# Patient Record
Sex: FEMALE | Race: WHITE | Employment: FULL TIME | URBAN - METROPOLITAN AREA
[De-identification: names, ages, dates, MRNs, and addresses within clinical notes are randomized per-mention and may not be internally consistent; named-entity substitution may affect disease eponyms.]

---

## 2022-08-25 NOTE — PROGRESS NOTES
Vision, Glaucoma, Visual Disturbances, Hearing Loss, Ringing in the Ears, Vertigo, Nose Bleed, Bleeding Gums, Hoarseness and Sore Throat. Neck: Not Present- Neck Pain and Neck Swelling. Respiratory: Not Present- Cough, Difficulty Breathing and Difficulty Breathing on Exertion. Breast: Not Present- Breast Mass, Breast Pain, Breast Swelling, Nipple Discharge, Nipple Pain, Recent Breast Size Changes and Skin Changes. Cardiovascular: Not Present- Abnormal Blood Pressure, Chest Pain, Edema, Fainting / Blacking Out, Palpitations, Shortness of Breath and Swelling of Extremities. Gastrointestinal: Not Present- Abdominal Pain, Abdominal Swelling, Bloating, Change in Bowel Habits, Constipation, Diarrhea, Difficulty Swallowing, Gets full quickly at meals, Nausea, Rectal Bleeding and Vomiting. Female Genitourinary: Not Present- Dysmenorrhea, Dyspareunia, Decreased libido, Excessive Menstrual Bleeding, Menstrual Irregularities, Pelvic Pain, Urinary Complaints, Vaginal Discharge, Vaginal itching/burning, Vaginal odor  Musculoskeletal: Not Present- Joint Pain and Muscle Pain. Neurological: Not Present- Dizziness, Fainting, Headaches and Seizures. Psychiatric: Not Present- Anxiety, Depression, Mood changes and Panic Attacks. Endocrine: Not Present- Appetite Changes, Cold Intolerance, Excessive Thirst, Excessive Urination and Heat Intolerance. Hematology: Not Present- Abnormal Bleeding, Easy Bruising and Enlarged Lymph Nodes. PHYSICAL EXAM:     /64   Ht 5' 4\" (1.626 m)   Wt 115 lb (52.2 kg)   BMI 19.74 kg/m²     Physical Exam   General   Mental Status - Alert. General Appearance - Cooperative. Integumentary   General Characteristics: Overall examination of the patient's skin reveals - no rashes and no suspicious lesions. Head and Neck  Head - normocephalic, atraumatic with no lesions or palpable masses.    Neck Note: Normal   Thyroid   Gland Characteristics - normal size and consistency and no palpable nodules. Chest and Lung Exam   Chest and lung exam reveals - on auscultation, normal breath sounds, no adventitious sounds and normal vocal resonance. Breast   Breast - Left - Normal. Right - Normal.     Cardiovascular   Cardiovascular examination reveals - normal heart sounds, regular rate and rhythm with no murmurs. Abdomen   Inspection: - Inspection Normal.   Palpation/Percussion: Palpation and Percussion of the abdomen reveal - Non Tender, No Rebound tenderness, No Rigidity (guarding), No hepatosplenomegaly, No Palpable abdominal masses and Soft. Auscultation: Auscultation of the abdomen reveals - Bowel sounds normal.     Female Genitourinary     External Genitalia   Vulva: - Normal. Perineum - Normal. Bartholin's Gland - Bilateral - Normal. Clitoris - Normal.   Introitus: Characteristics - Normal.   Urethra: Characteristics - Normal.     Speculum & Bimanual   Vagina: Vaginal Mucosa - Normal.   Vaginal Wall: - Normal.   Vaginal Lesions - None. Cervix: Characteristics - Normal.   Uterus: Characteristics - Normal.   Adnexa: - Normal.   Bladder - Normal.     Peripheral Vascular   Normal    Neuropsychiatric   Examination of related systems reveals - The patient is well-nourished and well-groomed. Mental status exam performed with findings of - Oriented X3 with appropriate mood and affect. Musculoskeletal  Normal      General Lymphatics  Normal           Medical problems and test results were reviewed with the patient today. ASSESSMENT and PLAN    1. Encounter for well woman exam with routine gynecological exam  2. Cervical cancer screening  -     PAP LB, Reflex HPV ASCUS           No follow-ups on file.        Brandon Oneal MD  8/26/2022

## 2022-08-26 ENCOUNTER — OFFICE VISIT (OUTPATIENT)
Dept: GYNECOLOGY | Age: 52
End: 2022-08-26
Payer: COMMERCIAL

## 2022-08-26 VITALS
BODY MASS INDEX: 19.63 KG/M2 | WEIGHT: 115 LBS | HEIGHT: 64 IN | SYSTOLIC BLOOD PRESSURE: 100 MMHG | DIASTOLIC BLOOD PRESSURE: 64 MMHG

## 2022-08-26 DIAGNOSIS — Z12.4 CERVICAL CANCER SCREENING: ICD-10-CM

## 2022-08-26 DIAGNOSIS — Z01.419 ENCOUNTER FOR WELL WOMAN EXAM WITH ROUTINE GYNECOLOGICAL EXAM: Primary | ICD-10-CM

## 2022-08-26 PROCEDURE — 99396 PREV VISIT EST AGE 40-64: CPT | Performed by: OBSTETRICS & GYNECOLOGY

## 2022-09-01 LAB
CYTOLOGIST CVX/VAG CYTO: NORMAL
CYTOLOGY CVX/VAG DOC THIN PREP: NORMAL
HPV REFLEX: NORMAL
Lab: NORMAL
PATH REPORT.FINAL DX SPEC: NORMAL
STAT OF ADQ CVX/VAG CYTO-IMP: NORMAL

## 2022-09-13 ENCOUNTER — HOSPITAL ENCOUNTER (OUTPATIENT)
Dept: PHYSICAL THERAPY | Age: 52
Setting detail: RECURRING SERIES
Discharge: HOME | End: 2022-09-16
Payer: COMMERCIAL

## 2022-09-13 PROCEDURE — 97110 THERAPEUTIC EXERCISES: CPT

## 2022-09-13 PROCEDURE — 97161 PT EVAL LOW COMPLEX 20 MIN: CPT

## 2022-09-14 ASSESSMENT — PAIN SCALES - GENERAL: PAINLEVEL_OUTOF10: 4

## 2022-09-14 NOTE — PROGRESS NOTES
Timoteo Edmond  : 1970  Primary: Charlie Mckee Sc  Secondary:  [address] @ [address] [place] [address]  Phone: [phone]  Fax: [phone] Plan Frequency: 1x/week (unable to meet recommended frequency of 2x/week due to time constraints and financial concerns)    Plan of Care/Certification Expiration Date: 11/10/22    PT Visit Info:  No Show: 0  Canceled Appointment: 0   Visit Count:  1   OUTPATIENT PHYSICAL THERAPY:OP NOTE TYPE: Treatment Note 2022       Episode  }Appt Desk             ICD-10: Treatment Diagnosis: Low back pain (M54.5)  Pain in left hip (M25.552)    Medical/Referring Diagnosis:  back pain  hip pain  Referring Physician:  Robb Aguilar MD MD Orders:  PT Eval and Treat   Date of Onset:  Onset Date: 22   Allergies:   Amoxicillin, Cephalexin, and Fluticasone-salmeterol  Restrictions/Precautions:  Restrictions/Precautions: None  No data recorded   Interventions Planned (Treatment may consist of any combination of the following):    Current Treatment Recommendations: Strengthening; Endurance training; Neuromuscular re-education; Manual Therapy - Soft Tissue Mobilization; Manual Therapy - Joint Manipulation; Pain management; Home exercise program; Modalities; Integrated dry needling; Therapeutic activities     Subjective Comments: See Initial Evaluation dated 22 for details     Initial:}    10Post Session:       4/10  Medications Last Reviewed:  2022  Updated Objective Findings:  See Initial Evaluation dated 22 for details  Treatment     THERAPEUTIC EXERCISE: (20 minutes):    Exercises per grid below to improve mobility, strength, balance, and coordination. Progressed resistance and repetitions as indicated.      Date:  22 Date:   Date:     Activity/Exercise Parameters Parameters Parameters   education Diagnosis, prognosis, POC, HEP, anatomy/physiology of condition       clam 3x10, blue band     Fire hydrant 3x10/side     Standing hip abd 2x10, blue band                           THERAPEUTIC ACTIVITY: ( 0 minutes): Therapeutic activities per grid below to improve mobility, strength, coordination, and dynamic movement  to improve functional lifting, carrying, reaching, catching, and overhead activities. Date:   Date:   Date:     Activity/Exercise Parameters Parameters Parameters                                                 MANUAL THERAPY: (0 minutes):   Joint mobilization, Soft tissue mobilization, and Manipulation was utilized and necessary because of the patient's restricted joint motion, painful spasm, loss of articular motion, and restricted motion of soft tissue. Date  9/13/2022      Technique Used Grade Level # Time(s) Effect while being performed                                                                                         HEP Log Date    see 9/13/22    2.     3.    4.     5.        POC    Recertification Expiration Date      Plan of Care/Certification Expiration Date: 11/10/22   Visit Count  1    Number of Allowed Visits              Treatment/Session Summary:    Treatment Assessment: See Initial Evaluation dated 9/13/22 for details     Communication/Consultation:  faxed initial evaluation to referring provider  Equipment provided today:  HEP handout, blue band  Recommendations/Intent for next treatment session: Next visit will focus on glute strengthening to tolerance.     Total Treatment Billable Duration:  20 minutes  Time In: 1879  Time Out: [address], PT       Charge Capture  }Post Session Pain  PT Visit [facility] Portal  MD Guidelines  Scanned Media  Benefits  MyChart    Future Appointments   Date Time Provider Connor Rodriguez   9/19/2022  1:45 PM Latasha Davis PT [facility] AND [facility]   9/28/2022  4:45 PM Latasha Davis PT [facility]

## 2022-09-14 NOTE — THERAPY EVALUATION
Marie   : 1970  Primary: Diana Mendoza Sc  Secondary:  [address] [address] [address] [address]  Phone: [phone]  Fax: [phone] Plan Frequency: 1x/week (unable to meet recommended frequency of 2x/week due to time constraints and financial concerns)    Plan of Care/Certification Expiration Date: 11/10/22    PT Visit Info:    No Show: 0  Canceled Appointment: 0    Visit Count:  1    OUTPATIENT PHYSICAL THERAPY:OP NOTE TYPE: Initial Assessment 2022               Episode  Appt Desk         ICD-10: Treatment Diagnosis: Low back pain (M54.5)  Pain in left hip (M25.552)  Medical/Referring Diagnosis:  back pain  hip pain  Referring Physician:  Ebony Melendez MD MD Orders:  PT Eval and Treat   Return MD Appt:  TBD  Date of Onset:  Onset Date: 22   Allergies:  Amoxicillin, Cephalexin, and Fluticasone-salmeterol  Restrictions/Precautions:    Restrictions/Precautions: None  No data recorded   Medications Last Reviewed:  2022     SUBJECTIVE   History of Injury/Illness (Reason for Referral):  Pt reports L hip pain and \"tailbone\" pain starting after a slip down 5 steps in 2022. She states she didn't have any pain the rest of that day but she did when she woke up the next morning. She states she didn't go to the doctor immediately because she figured nothing was broken. She eventually did have x-rays done that revealed no fractures. She's been going to her chiropractor and they have been working on her hip flexors, which seemed to help some. Sometimes, the pain will travel from her lateral hip to her medial knee. She notes the most trouble with the first several steps after sitting for a while, prolonged walking (as in at work), and sleeping due to pain. She states that she used a massager on her lateral hip and it seemed to make it feel a little better.  She regularly does bodyweight exercises at home for general fitness. Patient Stated Goal(s):  pain relief  Initial:     4/10 Post Session:     4/10  Past Medical History/Comorbidities:   Ms. Franck Meyers  has a past medical history of Hx of varicose veins. Ms. Franck Meyers  has a past surgical history that includes gyn. 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  No [address] recorded   Learning:   Does the patient/guardian have any barriers to learning?: No barriers  Will there be a co-learner?: No  What is the preferred language of the patient/guardian?: English  Is an  required?: No  How does the patient/guardian prefer to learn new concepts?: Listening; Demonstration; Pictures/Videos   Fall Risk Scale: Total Score: 0  Hernandez Fall Risk: Low (0-24)   Other Clinical Tests:  X-rays: negative for fracture per pt      OBJECTIVE     Observation/Orthostatic Postural Assessment:  [] This section not tested    General observations  Description: no gait abnormalities noted       Range of Motion   [] This section not tested    AROM     Hip         Lumbar    Flexion: 95 deg  Extension: 30 seg  Sidebending: fingers to knee bilaterally.  \"Pull\" on L when going to R  Rotation: shoulder to midline bilaterally    All lumbar ROM pain-free     Ankle           PROM    Hip   R Hip Flexion 0-125: ~130  R SLR: ~95  R Hip Internal Rotation 0-45: ~40  R Hip External Rotation 0-45: ~75  L Hip Flexion 0-125: ~130  L SLR: ~95  L Hip Internal Rotation 0-45: ~40  L Hip External Rotation 0-45: ~65 with familiar pain to lateral L hip     Knee         Ankle            Strength  [] This section not tested      Right Left   Hip     Flexion 4/5 4/5   Extension       Abduction 4/5 4-/5 (with familiar pain)   Adduction       Knee     Flexion 4+/5 4+/5   Extension 5/5 5/5   Ankle     Dorsiflexion 4/5 4/5   Plantarflexion             Special Tests  [] This section not tested    Test/Result    Jatinder Bustillo: R negative (20 deg), L positive for lateral hip pain (35 deg)       Joint Mobility Assessment/Palpation   [] This section not tested    Joint         Palpation    L hip: TTP with familiar pain reproduction over glute med       Muscle Length/Flexibility  [] This section not tested    Test/Result   Right Hamstrings: Elongated (~95 deg SLR)  Left Hamstrings: Elongated (~95 deg SLR)         Functional Mobility  [x] This section not tested    Test Result   Timed up and go     30 second sit to stand     6 minute walk test     Single Leg Balance         Test Comments   Sit to stand    Squat    Stair Negotiation        ASSESSMENT   Initial Assessment:  Marcello Albrecht presents to physical therapy with decreased strength, ROM, joint mobility, functional mobility. These S/S are consistent with referring diagnosis. Patient will benefit from skilled physical therapy for manual therapeutic techniques (as appropriate), therapeutic exercises and activities, neuromuscular re-education, and comprehensive home exercises program to address current impairments and functional limitations. Marcello Albrecht will benefit from skilled PT (medically necessary) in order to address above deficits affecting participation in basic ADLs and overall functional tolerance. Problem List: (Impacting functional limitations): Body Structures, Functions, Activity Limitations Requiring Skilled Therapeutic Intervention: Decreased functional mobility ; Decreased ROM; Decreased tolerance to work activity; Decreased strength;  Increased pain     Therapy Prognosis:   Therapy Prognosis: Good     Assessment Complexity:   Low Complexity  PLAN   Effective Dates: 9/14/2022   TO Plan of Care/Certification Expiration Date: 11/10/22 Frequency/Duration: Plan Frequency: 1x/week (unable to meet recommended frequency of 2x/week due to time constraints and financial concerns)     Interventions Planned (Treatment may consist of any combination of the following):    Current Treatment Recommendations: Strengthening; Endurance training; Neuromuscular re-education; Manual Therapy - Soft Tissue Mobilization; Manual Therapy - Joint Manipulation; Pain management; Home exercise program; Modalities; Integrated dry needling; Therapeutic activities     Goals: (Goals have been discussed and agreed upon with patient.)  Short-Term Functional Goals: Time Frame: 4 weeks  Pt will improve L Jaswant within 5 deg of R to demonstrate improved ROM. Pt will improve L hip ABD strength to at least 4/5 to demonstrate improved strength. Discharge Goals: Time Frame: 8 weeks  Pt will improve CHINO to <=5/50 to demonstrate improved functional abilities. Pt will lift at least 44 lbs from floor to waist to demonstrate improved functional abilities. Pt will report being able to complete full shift at work with no more than 2/10 pain to demonstrate improved tolerance to work activities. Outcome Measure: Tool Used: Lower Extremity Functional Scale (LEFS)  Score:  Initial: 71/80 Most Recent: X/80 (Date: -- )   Interpretation of Score: 20 questions each scored on a 5 point scale with 0 representing \"extreme difficulty or unable to perform\" and 4 representing \"no difficulty\". The lower the score, the greater the functional disability. 80/80 represents no disability. Minimal detectable change is 9 points. Tool Used: Modified Oswestry Low Back Pain Questionnaire  Score:  Initial: 14/50  Most Recent: X/50 (Date: -- )   Interpretation of Score: Each section is scored on a 0-5 scale, 5 representing the greatest disability. The scores of each section are added together for a total score of 50.       Medical Necessity:   Skilled intervention continues to be required due to current impairments. Reason For Services/Other Comments:  Patient continues to require skilled intervention due to patient continues to present with impairments assessed at initial evaluation and requiring skilled physical therapy to meet goals for PT. Total Duration:  Time In: 1642  Time Out: 8200    Regarding Dennys Figueroa's therapy, I certify that the treatment plan above will be carried out by a therapist or under their direction.   Thank you for this referral,  Carson Calzada, PT     Referring Physician Signature: Timoteo Oliveira MD _______________________________ Date _____________        Post Session Pain  Charge Capture  PT Visit Info MD Guidelines  [place]

## 2022-09-19 ENCOUNTER — HOSPITAL ENCOUNTER (OUTPATIENT)
Dept: PHYSICAL THERAPY | Age: 52
Setting detail: RECURRING SERIES
Discharge: HOME | End: 2022-09-22
Payer: COMMERCIAL

## 2022-09-19 PROCEDURE — 97110 THERAPEUTIC EXERCISES: CPT

## 2022-09-19 NOTE — PROGRESS NOTES
Osman Claudio  : 1970  Primary: Leti [place] Sc  Secondary:  Mirela Hutchinson @ [address] [facility] [address]  Phone: [phone]  Fax: [phone] Plan Frequency: 1x/week (unable to meet recommended frequency of 2x/week due to time constraints and financial concerns)    Plan of Care/Certification Expiration Date: 11/10/22      PT Visit Info:  No Show: 0  Canceled Appointment: 0     Visit Count:  2   OUTPATIENT PHYSICAL THERAPY:OP NOTE TYPE: Treatment Note 2022       Episode  }Appt Desk             ICD-10: Treatment Diagnosis: Low back pain (M54.5)  Pain in left hip (M25.552)    Medical/Referring Diagnosis:  back pain  hip pain  Referring Physician:  Rafael Lo MD MD Orders:  PT Eval and Treat   Date of Onset:  Onset Date: 22     Allergies:   Amoxicillin, Cephalexin, and Fluticasone-salmeterol  Restrictions/Precautions:  Restrictions/Precautions: None  No data recorded   Interventions Planned (Treatment may consist of any combination of the following):    Current Treatment Recommendations: Strengthening; Endurance training; Neuromuscular re-education; Manual Therapy - Soft Tissue Mobilization; Manual Therapy - Joint Manipulation; Pain management; Home exercise program; Modalities; Integrated dry needling; Therapeutic activities     Subjective Comments: Feels like her hip is getting \"more limber. \" She's been working on the sidelying hip ABD and feels that it's been helpful. Initial:}     /10Post Session:        /10  Medications Last Reviewed:  2022  Updated Objective Findings:  See Initial Evaluation dated 22 for details  Treatment     THERAPEUTIC EXERCISE: (40 minutes):    Exercises per grid below to improve mobility, strength, balance, and coordination. Progressed resistance and repetitions as indicated.      Date:  22 Date:  22 Date:     Activity/Exercise Parameters Parameters Parameters   education Diagnosis, prognosis, POC, HEP, anatomy/physiology of condition       clam 3x10, blue band     Fire hydrant 3x10/side     Standing hip abd 2x10, blue band     Treadmill walking  8 min, 3.1 mph    Sidesteps and monster walks  2x40 feet, old purple band    Hip hikes  3x10    Cross over step ups  3x10    Lateral step downs  3x10, 6 inch    STS  [id number] lbs, 12 inch, red band    Deadlift  [id number] lbs    Suitcase carries  4 laps, 20 lbs              THERAPEUTIC ACTIVITY: ( 0 minutes): Therapeutic activities per grid below to improve mobility, strength, coordination, and dynamic movement  to improve functional lifting, carrying, reaching, catching, and overhead activities. Date:   Date:   Date:     Activity/Exercise Parameters Parameters Parameters                                                 MANUAL THERAPY: (0 minutes):   Joint mobilization, Soft tissue mobilization, and Manipulation was utilized and necessary because of the patient's restricted joint motion, painful spasm, loss of articular motion, and restricted motion of soft tissue. Date  9/19/2022      Technique Used Grade Level # Time(s) Effect while being performed                                                                                         HEP Log Date    see 9/13/22    2.     3.    4.     5.        POC    Recertification Expiration Date      Plan of Care/Certification Expiration Date: 11/10/22     Visit Count  2    Number of Allowed Visits              Treatment/Session Summary:    Treatment Assessment: Increased volume of hip endurance exercises, focusing on CKC. Pt reported some fatigue by the end of the session but reported no increase in pain. Pt would continue to benefit from progression as tolerated to reduce lateral hip pain with prolonged walking.       Communication/Consultation:  faxed initial evaluation to referring provider  Equipment provided today:  HEP handout, blue band  Recommendations/Intent for next treatment session: Next visit will focus on glute strengthening to tolerance.     Total Treatment Billable Duration:  40 minutes  Time In: 0742  Time Out: 79-25 Marlyn Graham, PT       Charge Capture  }Post Session Pain  PT Visit [address] Portal  MD [address]    Future Appointments   Date Time Provider Connor Rodriguez   9/28/2022  4:45 PM Kat Salgado, PT [facility]

## 2022-09-28 ENCOUNTER — HOSPITAL ENCOUNTER (OUTPATIENT)
Dept: PHYSICAL THERAPY | Age: 52
Setting detail: RECURRING SERIES
Discharge: HOME | End: 2022-10-01
Payer: COMMERCIAL

## 2022-09-28 PROCEDURE — 97110 THERAPEUTIC EXERCISES: CPT

## 2022-10-10 ENCOUNTER — HOSPITAL ENCOUNTER (OUTPATIENT)
Dept: PHYSICAL THERAPY | Age: 52
Setting detail: RECURRING SERIES
Discharge: HOME | End: 2022-10-13
Payer: COMMERCIAL

## 2022-10-10 PROCEDURE — 97110 THERAPEUTIC EXERCISES: CPT

## 2022-10-10 NOTE — THERAPY DISCHARGE
Robbie Matthews  : 1970  Primary: Dakota Jim[address]nez Sc  Secondary:  [address] @ [address] [address] [address]  Phone: [phone]  Fax: [phone] Plan Frequency: 1x/week (unable to meet recommended frequency of 2x/week due to time constraints and financial concerns)    Plan of Care/Certification Expiration Date: 11/10/22      PT Visit Info:    No Show: 0  Canceled Appointment: 0      Visit Count:  4    OUTPATIENT PHYSICAL THERAPY:OP NOTE TYPE: Discharge Summary 10/10/2022               Episode  Appt Desk         ICD-10: Treatment Diagnosis: Low back pain (M54.5)  Pain in left hip (M25.552)  Medical/Referring Diagnosis:  back pain  hip pain  Referring Physician:  Era Stone MD MD Orders:  PT Eval and Treat   Return MD Appt:  TBD  Date of Onset:  Onset Date: 22     Allergies:  Amoxicillin, Cephalexin, and Fluticasone-salmeterol  Restrictions/Precautions:    Restrictions/Precautions: None  No data recorded   Medications Last Reviewed:  10/10/2022     SUBJECTIVE   History of Injury/Illness (Reason for Referral):  Pt reports L hip pain and \"tailbone\" pain starting after a slip down 5 steps in 2022. She states she didn't have any pain the rest of that day but she did when she woke up the next morning. She states she didn't go to the doctor immediately because she figured nothing was broken. She eventually did have x-rays done that revealed no fractures. She's been going to her chiropractor and they have been working on her hip flexors, which seemed to help some. Sometimes, the pain will travel from her lateral hip to her medial knee. She notes the most trouble with the first several steps after sitting for a while, prolonged walking (as in at work), and sleeping due to pain. She states that she used a massager on her lateral hip and it seemed to make it feel a little better.  She regularly does bodyweight exercises at home for general fitness. Discharge: Feeling pretty good. Confident in doing her exercises at home. Patient Stated Goal(s):  pain relief  Initial:      /10 Post Session:      /10  Past Medical History/Comorbidities:   Ms. Fausto Gold  has a past medical history of Hx of varicose veins. Ms. Fausto Gold  has a past surgical history that includes gyn. Social History/Living Environment:   Lives With: Spouse     Prior Level of Function/Work/Activity:   Prior level of function: Independent  Current level of function: difficulty walking >2-3 hours, difficulty walking after sitting, difficulty sleeping, pain with stairs  Occupation: Full time employment  Type of Occupation: works in manufacturing  No [address] recorded   Learning:   Does the patient/guardian have any barriers to learning?: No barriers  Will there be a co-learner?: No  What is the preferred language of the patient/guardian?: English  Is an  required?: No  How does the patient/guardian prefer to learn new concepts?: Listening; Demonstration; Pictures/Videos     Fall Risk Scale: Hernandez Total Score: 0  Hernandez Fall Risk: Low (0-24)     Other Clinical Tests:  X-rays: negative for fracture per pt       ASSESSMENT   Initial Assessment:  Keya Lucio demonstrates significantly improved functional strength and self-reported decrease in pain and improvement in functional abilities. Pt demonstrates independence with HEP and is appropriate to discharge at this time. PLAN   Discharge     Goals: (Goals have been discussed and agreed upon with patient.)    Discharge Goals: Time Frame: 8 weeks  Pt will improve CHINO to <=5/50 to demonstrate improved functional abilities. (Met)  Pt will lift at least 44 lbs from floor to waist to demonstrate improved functional abilities. (Met)  Pt will report being able to complete full shift at work with no more than 2/10 pain to demonstrate improved tolerance to work activities. (Met)          Outcome Measure:    Tool Used: Lower Extremity Functional Scale (LEFS)  Score:  Initial: 71/80 Most Recent: 80/80 (Date: 10/10/22 )   Interpretation of Score: 20 questions each scored on a 5 point scale with 0 representing \"extreme difficulty or unable to perform\" and 4 representing \"no difficulty\". The lower the score, the greater the functional disability. 80/80 represents no disability. Minimal detectable change is 9 points. Tool Used: Modified Oswestry Low Back Pain Questionnaire  Score:  Initial: 14/50  Most Recent: 0/50 (Date: 10/10/22 )   Interpretation of Score: Each section is scored on a 0-5 scale, 5 representing the greatest disability. The scores of each section are added together for a total score of 50. Medical Necessity:   Skilled intervention continues to be required due to current impairments. Reason For Services/Other Comments:  Patient continues to require skilled intervention due to patient continues to present with impairments assessed at initial evaluation and requiring skilled physical therapy to meet goals for PT. Total Duration:  Time In: 1545  Time Out: 36    Regarding Kings Figueroa's therapy, I certify that the treatment plan above will be carried out by a therapist or under their direction.   Thank you for this referral,  Armaan Sam, PT     Referring Physician Signature: Sameer Robert MD _______________________________ Date _____________        Post Session Pain  Charge Capture  PT Visit Info MD Handy Collinsharashley

## 2022-10-10 NOTE — PROGRESS NOTES
Jammie Velazquez  : 1970  Primary: Emerald Burciaga Sc  Secondary:  [address] @ [address] [address] [address]  Phone: [phone]  Fax: [phone] Plan Frequency: 1x/week (unable to meet recommended frequency of 2x/week due to time constraints and financial concerns)    Plan of Care/Certification Expiration Date: 11/10/22      PT Visit Info:  No Show: 0  Canceled Appointment: 0     Visit Count:  4   OUTPATIENT PHYSICAL THERAPY:OP NOTE TYPE: Treatment Note 10/10/2022       Episode  }Appt Desk             ICD-10: Treatment Diagnosis: Low back pain (M54.5)  Pain in left hip (M25.552)    Medical/Referring Diagnosis:  back pain  hip pain  Referring Physician:  Inna Major MD MD Orders:  PT Eval and Treat   Date of Onset:  Onset Date: 22     Allergies:   Amoxicillin, Cephalexin, and Fluticasone-salmeterol  Restrictions/Precautions:  Restrictions/Precautions: None  No data recorded   Interventions Planned (Treatment may consist of any combination of the following):    Current Treatment Recommendations: Strengthening; Endurance training; Neuromuscular re-education; Manual Therapy - Soft Tissue Mobilization; Manual Therapy - Joint Manipulation; Pain management; Home exercise program; Modalities; Integrated dry needling; Therapeutic activities     Subjective Comments: See discharge summary dated 10/10/22 for details     Initial:     /10Post Session:        /10  Medications Last Reviewed:  10/10/2022  Updated Objective Findings:  See discharge summary dated 10/10/22 for details  Treatment     THERAPEUTIC EXERCISE: (45 minutes):    Exercises per grid below to improve mobility, strength, balance, and coordination. Progressed resistance and repetitions as indicated.      Date:  22 Date:  22 Date:   Date  10/10/22   Activity/Exercise Parameters Parameters Parameters    education Diagnosis, prognosis, POC, HEP, anatomy/physiology of condition Strength rep ranges, rest between sets   clam 3x10, blue band      Fire hydrant 3x10/side      Standing hip abd 2x10, blue band      Treadmill walking  8 min, 3.1 mph 8 min, 3.1 mph 8 min   Sidesteps and monster walks  2x40 feet, old purple band 2x40 feet, old purple band + 10 lbs at chest 2x40 feet, blue   Hip hikes  3x10     Cross over step ups  3x10     Lateral step downs  3x10, 6 inch     STS  1j16s14 lbs, 12 inch, red band (Back squat) 5i67u49 lbs 3 warm up sets  3x5x45, 2 reps in reserve   Deadlift  3k18y45 lbs 5u82s92 lbs    Suitcase carries  4 laps, 20 lbs     Sled push/pull   120 lbs, x4 120 lbs, x4       THERAPEUTIC ACTIVITY: ( 0 minutes): Therapeutic activities per grid below to improve mobility, strength, coordination, and dynamic movement  to improve functional lifting, carrying, reaching, catching, and overhead activities. Date:   Date:   Date:     Activity/Exercise Parameters Parameters Parameters                                                 MANUAL THERAPY: (0 minutes):   Joint mobilization, Soft tissue mobilization, and Manipulation was utilized and necessary because of the patient's restricted joint motion, painful spasm, loss of articular motion, and restricted motion of soft tissue.               Date  10/10/2022      Technique Used Grade Level # Time(s) Effect while being performed                                                                                         HEP Log Date    see 9/13/22    2.     3.    4.     5.        POC    Recertification Expiration Date      Plan of Care/Certification Expiration Date: 11/10/22     Visit Count  4    Number of Allowed Visits              Treatment/Session Summary:    Treatment Assessment: See discharge summary dated 10/10/22 for details     Communication/Consultation:  See discharge summary dated 10/10/22 for details  Equipment provided today:  none today  Recommendations/Intent for next treatment session: See discharge summary dated 10/10/22 for details    Total Treatment Billable Duration:  45 minutes  Time In: 4073  Time Out: [address], PT       Charge Capture  }Post Session Pain  PT Visit [address] Portal  MD Guidelines  Scanned Media  Benefits  MyChart    Future Appointments   Date Time Provider Connor Rodriguez   10/20/2022  5:15 PM [facility]ckenzie[facility]

## 2022-10-11 ENCOUNTER — APPOINTMENT (OUTPATIENT)
Dept: PHYSICAL THERAPY | Age: 52
End: 2022-10-11
Payer: COMMERCIAL

## 2022-10-20 ENCOUNTER — APPOINTMENT (OUTPATIENT)
Dept: PHYSICAL THERAPY | Age: 52
End: 2022-10-20
Payer: COMMERCIAL

## 2023-09-01 NOTE — PROGRESS NOTES
Kuldeep Clifton  : 1970  Primary: Kiara Coombs  Secondary:  [address] [address] [address] [address]  Phone: [phone]  Fax: [phone] Plan Frequency: 1x/week (unable to meet recommended frequency of 2x/week due to time constraints and financial concerns)    Plan of Care/Certification Expiration Date: 11/10/22      PT Visit Info:  No Show: 0  Canceled Appointment: 0     Visit Count:  3   OUTPATIENT PHYSICAL THERAPY:OP NOTE TYPE: Treatment Note 2022       Episode  }Appt Desk             ICD-10: Treatment Diagnosis: Low back pain (M54.5)  Pain in left hip (M25.552)    Medical/Referring Diagnosis:  back pain  hip pain  Referring Physician:  Alice Aranda MD MD Orders:  PT Eval and Treat   Date of Onset:  Onset Date: 22     Allergies:   Amoxicillin, Cephalexin, and Fluticasone-salmeterol  Restrictions/Precautions:  Restrictions/Precautions: None  No data recorded   Interventions Planned (Treatment may consist of any combination of the following):    Current Treatment Recommendations: Strengthening; Endurance training; Neuromuscular re-education; Manual Therapy - Soft Tissue Mobilization; Manual Therapy - Joint Manipulation; Pain management; Home exercise program; Modalities; Integrated dry needling; Therapeutic activities     Subjective Comments: Feels like she's improving. Had an instance of pain after a heavier day of work but overall she's felt pretty good. She's been doing all of the exercises that we did last session every day and has been feeling pretty good with it, though she felt tired yesterday. Initial:}     /10Post Session:        /10  Medications Last Reviewed:  2022  Updated Objective Findings:  See Initial Evaluation dated 22 for details  Treatment     THERAPEUTIC EXERCISE: (43 minutes):    Exercises per grid below to improve mobility, strength, balance, and coordination.    Progressed resistance and No protocol for requested medication     Last office visit date: 6/12/2023  Preferred pharmacy: [id number]    Order pended, routed to clinician for review.     Refill Requested:   Disp Refills Start End    methylphenidate (METADATE CD) 40 MG CR capsule 30 capsule 0 7/11/2023     Sig - Route: Take 1 capsule by mouth every morning      Recommended Follow Up: 11wks  No Show/Cancel: na  Next visit: 9/6/2023    Controlled Med - Routed to Provider due to NO PROTOCOL. Orders have been prepped according to providers note.     Ordered date: 6/12/2023  Last RX dispensed (per PDMP): 7/23/2023     repetitions as indicated. Date:  9/13/22 Date:  9/19/22 Date:     Activity/Exercise Parameters Parameters Parameters   education Diagnosis, prognosis, POC, HEP, anatomy/physiology of condition       clam 3x10, blue band     Fire hydrant 3x10/side     Standing hip abd 2x10, blue band     Treadmill walking  8 min, 3.1 mph 8 min, 3.1 mph   Sidesteps and monster walks  2x40 feet, old purple band 2x40 feet, old purple band + 10 lbs at chest   Hip hikes  3x10    Cross over step ups  3x10    Lateral step downs  3x10, 6 inch    STS  2p94l80 lbs, 12 inch, red band (Back squat) 1n64z13 lbs   Deadlift  5w90l07 lbs 3h16u77 lbs   Suitcase carries  4 laps, 20 lbs    Sled push/pull   120 lbs, x4       THERAPEUTIC ACTIVITY: ( 0 minutes): Therapeutic activities per grid below to improve mobility, strength, coordination, and dynamic movement  to improve functional lifting, carrying, reaching, catching, and overhead activities. Date:   Date:   Date:     Activity/Exercise Parameters Parameters Parameters                                                 MANUAL THERAPY: (0 minutes):   Joint mobilization, Soft tissue mobilization, and Manipulation was utilized and necessary because of the patient's restricted joint motion, painful spasm, loss of articular motion, and restricted motion of soft tissue. Date  9/28/2022      Technique Used Grade Level # Time(s) Effect while being performed                                                                                         HEP Log Date    see 9/13/22    2.     3.    4.     5.        POC    Recertification Expiration Date      Plan of Care/Certification Expiration Date: 11/10/22     Visit Count  3    Number of Allowed Visits              Treatment/Session Summary:    Treatment Assessment: Progressed hip strengthening exercises as noted today with good tolerance.  Added sled push and pull today to mimic job duties that are still problematic (running backward pulling parts). Also incorporated barbell versions of compound lifts today. Pt with good form overall, though she did require intermittent cueing for hip ER with box squats. Communication/Consultation:  faxed initial evaluation to referring provider  Equipment provided today:  HEP handout, blue band  Recommendations/Intent for next treatment session: Next visit will focus on glute strengthening to tolerance.     Total Treatment Billable Duration:  43 minutes  Time In: 5300  Time Out: [address], PT       Charge Capture  }Post Session Pain  PT Visit [address] [facility] Portal  MD Guidelines  Scanned Media  Benefits  MyChart    Future Appointments   Date Time Provider Connor Rodriguez   10/11/2022  5:15 PM Elisa Newberry, PT [facility]   10/20/2022  5:15 PM Elisa Newberry, [facility]

## 2025-02-27 ENCOUNTER — OFFICE VISIT (OUTPATIENT)
Dept: INTERNAL MEDICINE CLINIC | Facility: CLINIC | Age: 55
End: 2025-02-27

## 2025-02-27 VITALS
SYSTOLIC BLOOD PRESSURE: 113 MMHG | DIASTOLIC BLOOD PRESSURE: 70 MMHG | HEART RATE: 63 BPM | OXYGEN SATURATION: 100 % | HEIGHT: 64 IN | BODY MASS INDEX: 20.66 KG/M2 | WEIGHT: 121 LBS

## 2025-02-27 DIAGNOSIS — Z12.12 ENCOUNTER FOR SCREENING FOR COLORECTAL MALIGNANT NEOPLASM: ICD-10-CM

## 2025-02-27 DIAGNOSIS — Z76.89 ENCOUNTER TO ESTABLISH CARE: ICD-10-CM

## 2025-02-27 DIAGNOSIS — Z12.11 ENCOUNTER FOR SCREENING FOR COLORECTAL MALIGNANT NEOPLASM: ICD-10-CM

## 2025-02-27 DIAGNOSIS — Z13.220 ENCOUNTER FOR SCREENING FOR LIPID DISORDER: ICD-10-CM

## 2025-02-27 DIAGNOSIS — T78.2XXA ANAPHYLAXIS DUE TO HONEY BEE VENOM: ICD-10-CM

## 2025-02-27 DIAGNOSIS — J30.89 ENVIRONMENTAL AND SEASONAL ALLERGIES: ICD-10-CM

## 2025-02-27 DIAGNOSIS — T63.441A ANAPHYLAXIS DUE TO HONEY BEE VENOM: ICD-10-CM

## 2025-02-27 DIAGNOSIS — Z76.89 ENCOUNTER TO ESTABLISH CARE: Primary | ICD-10-CM

## 2025-02-27 LAB
ALBUMIN SERPL-MCNC: 4.1 G/DL (ref 3.5–5)
ALBUMIN/GLOB SERPL: 1.3 (ref 1–1.9)
ALP SERPL-CCNC: 62 U/L (ref 35–104)
ALT SERPL-CCNC: 16 U/L (ref 8–45)
ANION GAP SERPL CALC-SCNC: 8 MMOL/L (ref 7–16)
AST SERPL-CCNC: 26 U/L (ref 15–37)
BASOPHILS # BLD: 0.02 K/UL (ref 0–0.2)
BASOPHILS NFR BLD: 0.4 % (ref 0–2)
BILIRUB SERPL-MCNC: 0.4 MG/DL (ref 0–1.2)
BUN SERPL-MCNC: 11 MG/DL (ref 6–23)
CALCIUM SERPL-MCNC: 9.7 MG/DL (ref 8.8–10.2)
CHLORIDE SERPL-SCNC: 104 MMOL/L (ref 98–107)
CHOLEST SERPL-MCNC: 193 MG/DL (ref 0–200)
CO2 SERPL-SCNC: 29 MMOL/L (ref 20–29)
CREAT SERPL-MCNC: 0.54 MG/DL (ref 0.6–1.1)
DIFFERENTIAL METHOD BLD: NORMAL
EOSINOPHIL # BLD: 0.18 K/UL (ref 0–0.8)
EOSINOPHIL NFR BLD: 3.3 % (ref 0.5–7.8)
ERYTHROCYTE [DISTWIDTH] IN BLOOD BY AUTOMATED COUNT: 12.3 % (ref 11.9–14.6)
GLOBULIN SER CALC-MCNC: 3.1 G/DL (ref 2.3–3.5)
GLUCOSE SERPL-MCNC: 80 MG/DL (ref 70–99)
HCT VFR BLD AUTO: 44.7 % (ref 35.8–46.3)
HDLC SERPL-MCNC: 58 MG/DL (ref 40–60)
HDLC SERPL: 3.3 (ref 0–5)
HGB BLD-MCNC: 14.4 G/DL (ref 11.7–15.4)
IMM GRANULOCYTES # BLD AUTO: 0.01 K/UL (ref 0–0.5)
IMM GRANULOCYTES NFR BLD AUTO: 0.2 % (ref 0–5)
LDLC SERPL CALC-MCNC: 124 MG/DL (ref 0–100)
LYMPHOCYTES # BLD: 1.77 K/UL (ref 0.5–4.6)
LYMPHOCYTES NFR BLD: 32.4 % (ref 13–44)
MCH RBC QN AUTO: 31 PG (ref 26.1–32.9)
MCHC RBC AUTO-ENTMCNC: 32.2 G/DL (ref 31.4–35)
MCV RBC AUTO: 96.1 FL (ref 82–102)
MONOCYTES # BLD: 0.39 K/UL (ref 0.1–1.3)
MONOCYTES NFR BLD: 7.1 % (ref 4–12)
NEUTS SEG # BLD: 3.1 K/UL (ref 1.7–8.2)
NEUTS SEG NFR BLD: 56.6 % (ref 43–78)
NRBC # BLD: 0 K/UL (ref 0–0.2)
PLATELET # BLD AUTO: 272 K/UL (ref 150–450)
PMV BLD AUTO: 9.8 FL (ref 9.4–12.3)
POTASSIUM SERPL-SCNC: 3.8 MMOL/L (ref 3.5–5.1)
PROT SERPL-MCNC: 7.2 G/DL (ref 6.3–8.2)
RBC # BLD AUTO: 4.65 M/UL (ref 4.05–5.2)
SODIUM SERPL-SCNC: 141 MMOL/L (ref 136–145)
TRIGL SERPL-MCNC: 53 MG/DL (ref 0–150)
VLDLC SERPL CALC-MCNC: 11 MG/DL (ref 6–23)
WBC # BLD AUTO: 5.5 K/UL (ref 4.3–11.1)

## 2025-02-27 RX ORDER — EPINEPHRINE 0.3 MG/.3ML
0.3 INJECTION SUBCUTANEOUS ONCE
Qty: 0.3 ML | Refills: 0 | Status: SHIPPED | OUTPATIENT
Start: 2025-02-27 | End: 2025-02-27

## 2025-02-27 SDOH — ECONOMIC STABILITY: FOOD INSECURITY: WITHIN THE PAST 12 MONTHS, YOU WORRIED THAT YOUR FOOD WOULD RUN OUT BEFORE YOU GOT MONEY TO BUY MORE.: NEVER TRUE

## 2025-02-27 SDOH — ECONOMIC STABILITY: FOOD INSECURITY: WITHIN THE PAST 12 MONTHS, THE FOOD YOU BOUGHT JUST DIDN'T LAST AND YOU DIDN'T HAVE MONEY TO GET MORE.: NEVER TRUE

## 2025-02-27 ASSESSMENT — PATIENT HEALTH QUESTIONNAIRE - PHQ9
SUM OF ALL RESPONSES TO PHQ QUESTIONS 1-9: 0
1. LITTLE INTEREST OR PLEASURE IN DOING THINGS: NOT AT ALL
SUM OF ALL RESPONSES TO PHQ QUESTIONS 1-9: 0
2. FEELING DOWN, DEPRESSED OR HOPELESS: NOT AT ALL
SUM OF ALL RESPONSES TO PHQ QUESTIONS 1-9: 0
SUM OF ALL RESPONSES TO PHQ9 QUESTIONS 1 & 2: 0
SUM OF ALL RESPONSES TO PHQ QUESTIONS 1-9: 0

## 2025-02-27 NOTE — PROGRESS NOTES
visit.        Objective     Vitals:    02/27/25 0941   BP: 113/70   Site: Right Upper Arm   Position: Sitting   Cuff Size: Medium Adult   Pulse: 63   SpO2: 100%   Weight: 54.9 kg (121 lb)   Height: 1.626 m (5' 4\")        Physical Exam:  Constitutional: Appears well kempt. Alert/oriented x3. In no acute distress.  Head: Normocephalic No trauma. No deformity.   Neck: Supple. ROM normal. No tenderness. No masses.  Eyes: PERRLA. Conjunctivae normal. No discharge.  Ears: External ears normal. TM normal. No discharge from ears.   Nose: Nose normal. Nares patent.   Throat: Clear. No exudates. No erythema.   Cardiac: Heart with normal rate/rhythm. No murmurs. No gallops. Pulses normal.   Pulmonary: Lungs clear to auscultation bilaterally. In no respiratory distress. No wheezing. No rales. No rhonci.   Gastrointestinal: Bowel sounds present. Abdomen soft and nondistended.   Musculoskeletal: Moves all extremities with good ROM. Non-tender. No swelling. No edema.   Neurological: No numbness. No tingling. Alert and oriented. At baseline. No confusion. Patellar Reflexes 2+.  Psychiatric: Normal thought content. Normal behavior. Normal judgment.     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  She is doing well today with no acute complaints.    Environmental and Seasonal Allergies  -She does not take any medications for allergies  -Recommend Flonase as needed for when her allergies are bad or when she feels like her ears are plugged.    History of Gastroparesis   -She had a scope in the past and states she thinks they might of \"clipped some of her intestine\"  -Patient had barium swallow.   -Patient denies any current issues. Not following with GI.     Health

## 2025-02-28 NOTE — RESULT ENCOUNTER NOTE
Patient's LDL or bad cholesterol was slightly elevated at 124, she should continue focusing on eating healthy and getting of exercise.  The rest of her labs are normal.

## 2025-03-22 LAB — NONINV COLON CA DNA+OCC BLD SCRN STL QL: NEGATIVE

## 2025-03-24 ENCOUNTER — RESULTS FOLLOW-UP (OUTPATIENT)
Dept: INTERNAL MEDICINE CLINIC | Facility: CLINIC | Age: 55
End: 2025-03-24

## 2025-07-30 ENCOUNTER — HOSPITAL ENCOUNTER (EMERGENCY)
Age: 55
Discharge: HOME | End: 2025-07-30
Attending: EMERGENCY MEDICINE
Payer: COMMERCIAL

## 2025-07-30 VITALS
HEART RATE: 61 BPM | HEIGHT: 64 IN | TEMPERATURE: 97.9 F | BODY MASS INDEX: 19.63 KG/M2 | WEIGHT: 115 LBS | DIASTOLIC BLOOD PRESSURE: 67 MMHG | OXYGEN SATURATION: 98 % | RESPIRATION RATE: 16 BRPM | SYSTOLIC BLOOD PRESSURE: 102 MMHG

## 2025-07-30 DIAGNOSIS — L03.011 CELLULITIS OF FINGER OF RIGHT HAND: Primary | ICD-10-CM

## 2025-07-30 PROCEDURE — 99283 EMERGENCY DEPT VISIT LOW MDM: CPT

## 2025-07-30 RX ORDER — SULFAMETHOXAZOLE AND TRIMETHOPRIM 800; 160 MG/1; MG/1
1 TABLET ORAL 2 TIMES DAILY
Qty: 20 TABLET | Refills: 0 | Status: SHIPPED | OUTPATIENT
Start: 2025-07-30 | End: 2025-08-09

## 2025-07-30 ASSESSMENT — PAIN DESCRIPTION - LOCATION: LOCATION: FINGER (COMMENT WHICH ONE)

## 2025-07-30 ASSESSMENT — LIFESTYLE VARIABLES: HOW MANY STANDARD DRINKS CONTAINING ALCOHOL DO YOU HAVE ON A TYPICAL DAY: PATIENT DOES NOT DRINK

## 2025-07-30 ASSESSMENT — PAIN SCALES - GENERAL: PAINLEVEL_OUTOF10: 10

## 2025-07-30 ASSESSMENT — PAIN - FUNCTIONAL ASSESSMENT
PAIN_FUNCTIONAL_ASSESSMENT: 0-10
PAIN_FUNCTIONAL_ASSESSMENT: ACTIVITIES ARE NOT PREVENTED

## 2025-07-30 ASSESSMENT — PAIN DESCRIPTION - DESCRIPTORS: DESCRIPTORS: SHARP;PRESSURE

## 2025-07-30 ASSESSMENT — PAIN DESCRIPTION - ORIENTATION: ORIENTATION: RIGHT

## 2025-07-30 ASSESSMENT — PAIN DESCRIPTION - FREQUENCY: FREQUENCY: CONTINUOUS

## 2025-07-30 NOTE — ED PROVIDER NOTES
effusion 09/13/2010        Past Surgical History:   Procedure Laterality Date    GYN      D&C; ablation; tubal ligation (1 surgery)        Social History     Socioeconomic History    Marital status:      Spouse name: None    Number of children: None    Years of education: None    Highest education level: None   Tobacco Use    Smoking status: Former     Current packs/day: 0.00     Types: Cigarettes     Quit date: 9/2/2009     Years since quitting: 15.9     Passive exposure: Past    Smokeless tobacco: Never   Vaping Use    Vaping status: Never Used   Substance and Sexual Activity    Alcohol use: No    Drug use: No    Sexual activity: Yes     Partners: Male     Birth control/protection: Surgical     Comment: Tubal   Social History Narrative    Works at [employer] in [facility].  Has a 5 yr old son.  Owns horses.  No TB or asbestos exposure.    No history of physical or sexual abuse feels safe at home     Social Drivers of Health     Food Insecurity: No Food Insecurity (2/27/2025)    Hunger Vital Sign     Worried About Running Out of Food in the Last Year: Never true     Ran Out of Food in the Last Year: Never true   Transportation Needs: No Transportation Needs (2/27/2025)    PRAPARE - Transportation     Lack of Transportation (Medical): No     Lack of Transportation (Non-Medical): No   Social Connections: Unknown (3/20/2021)    Received from [employer]    Social Connections     Frequency of Communication with Friends and Family: Not asked     Frequency of Social Gatherings with Friends and Family: Not asked   Intimate Partner Violence: Unknown (3/20/2021)    Received from [employer]    Intimate Partner Violence     Fear of Current or Ex-Partner: Not asked     Emotionally Abused: Not asked     Physically Abused: Not asked     Sexually Abused: Not asked   Housing Stability: Low Risk  (2/27/2025)    Housing Stability Vital Sign     Unable to Pay for Housing in the Last Year: No     Number of Times Moved in

## 2025-07-30 NOTE — ED NOTES
Patient mobility status ambulates with no difficulty.     I have reviewed discharge instructions with the patient.  The patient verbalized understanding.    Patient left ED via ambulatory Discharge Method: ambulatory to Home with .self    Opportunity for questions and clarification provided.     Patient given 1 scripts.

## 2025-07-30 NOTE — ED TRIAGE NOTES
Pt states that she had a work related injury to the right index finger on July 18th.  States that she was seen and treated at Urgent care for the same c/o.  Tip of the finger is swollen

## 2025-08-04 ENCOUNTER — HOSPITAL ENCOUNTER (EMERGENCY)
Age: 55
Discharge: HOME | End: 2025-08-04
Attending: EMERGENCY MEDICINE
Payer: COMMERCIAL

## 2025-08-04 VITALS
TEMPERATURE: 98.9 F | HEIGHT: 64 IN | SYSTOLIC BLOOD PRESSURE: 128 MMHG | RESPIRATION RATE: 17 BRPM | BODY MASS INDEX: 19.63 KG/M2 | HEART RATE: 81 BPM | DIASTOLIC BLOOD PRESSURE: 69 MMHG | WEIGHT: 115 LBS | OXYGEN SATURATION: 100 %

## 2025-08-04 DIAGNOSIS — L03.011 CELLULITIS OF FINGER OF RIGHT HAND: ICD-10-CM

## 2025-08-04 DIAGNOSIS — L03.011 PARONYCHIA OF FINGER OF RIGHT HAND: Primary | ICD-10-CM

## 2025-08-04 PROCEDURE — 87070 CULTURE OTHR SPECIMN AEROBIC: CPT

## 2025-08-04 PROCEDURE — 99283 EMERGENCY DEPT VISIT LOW MDM: CPT

## 2025-08-04 PROCEDURE — 87186 SC STD MICRODIL/AGAR DIL: CPT

## 2025-08-04 PROCEDURE — 87077 CULTURE AEROBIC IDENTIFY: CPT

## 2025-08-04 PROCEDURE — 87205 SMEAR GRAM STAIN: CPT

## 2025-08-04 ASSESSMENT — PAIN - FUNCTIONAL ASSESSMENT: PAIN_FUNCTIONAL_ASSESSMENT: 0-10

## 2025-08-04 ASSESSMENT — PAIN SCALES - GENERAL: PAINLEVEL_OUTOF10: 9

## 2025-08-07 LAB
BACTERIA SPEC CULT: ABNORMAL
BACTERIA SPEC CULT: ABNORMAL
GRAM STN SPEC: ABNORMAL
GRAM STN SPEC: ABNORMAL
SERVICE CMNT-IMP: ABNORMAL